# Patient Record
Sex: MALE | Race: WHITE
[De-identification: names, ages, dates, MRNs, and addresses within clinical notes are randomized per-mention and may not be internally consistent; named-entity substitution may affect disease eponyms.]

---

## 2019-09-03 ENCOUNTER — HOSPITAL ENCOUNTER (OUTPATIENT)
Dept: HOSPITAL 75 - PREOP | Age: 69
End: 2019-09-03
Attending: SPECIALIST
Payer: COMMERCIAL

## 2019-09-03 VITALS — HEIGHT: 74 IN | BODY MASS INDEX: 26.31 KG/M2 | WEIGHT: 205 LBS

## 2019-09-03 DIAGNOSIS — Z98.890: ICD-10-CM

## 2019-09-03 DIAGNOSIS — Z90.49: ICD-10-CM

## 2019-09-03 DIAGNOSIS — F10.99: ICD-10-CM

## 2019-09-03 DIAGNOSIS — Z87.891: ICD-10-CM

## 2019-09-03 DIAGNOSIS — Z01.818: Primary | ICD-10-CM

## 2019-09-03 DIAGNOSIS — H25.89: ICD-10-CM

## 2019-09-06 ENCOUNTER — HOSPITAL ENCOUNTER (OUTPATIENT)
Dept: HOSPITAL 75 - SDC | Age: 69
Discharge: HOME | End: 2019-09-06
Attending: SPECIALIST
Payer: COMMERCIAL

## 2019-09-06 VITALS — SYSTOLIC BLOOD PRESSURE: 159 MMHG | DIASTOLIC BLOOD PRESSURE: 87 MMHG

## 2019-09-06 VITALS — DIASTOLIC BLOOD PRESSURE: 86 MMHG | SYSTOLIC BLOOD PRESSURE: 145 MMHG

## 2019-09-06 VITALS — HEIGHT: 74 IN | WEIGHT: 205 LBS | BODY MASS INDEX: 26.31 KG/M2

## 2019-09-06 DIAGNOSIS — C43.9: ICD-10-CM

## 2019-09-06 DIAGNOSIS — Z79.899: ICD-10-CM

## 2019-09-06 DIAGNOSIS — H25.11: Primary | ICD-10-CM

## 2019-09-06 DIAGNOSIS — Z96.649: ICD-10-CM

## 2019-09-06 DIAGNOSIS — I10: ICD-10-CM

## 2019-09-06 DIAGNOSIS — E03.9: ICD-10-CM

## 2019-09-06 DIAGNOSIS — Z87.891: ICD-10-CM

## 2019-09-06 DIAGNOSIS — Z80.3: ICD-10-CM

## 2019-09-06 RX ADMIN — TETRACAINE HYDROCHLORIDE PRN ML: 5 SOLUTION OPHTHALMIC at 06:18

## 2019-09-06 RX ADMIN — TETRACAINE HYDROCHLORIDE PRN ML: 5 SOLUTION OPHTHALMIC at 06:36

## 2019-09-06 RX ADMIN — PHENYLEPHRINE HYDROCHLORIDE SCH ML: 100 SOLUTION/ DROPS OPHTHALMIC at 06:45

## 2019-09-06 RX ADMIN — CYCLOPENTOLATE HYDROCHLORIDE SCH ML: 10 SOLUTION/ DROPS OPHTHALMIC at 06:45

## 2019-09-06 RX ADMIN — CYCLOPENTOLATE HYDROCHLORIDE SCH ML: 10 SOLUTION/ DROPS OPHTHALMIC at 06:36

## 2019-09-06 RX ADMIN — PHENYLEPHRINE HYDROCHLORIDE SCH ML: 100 SOLUTION/ DROPS OPHTHALMIC at 06:36

## 2019-09-06 RX ADMIN — TETRACAINE HYDROCHLORIDE PRN ML: 5 SOLUTION OPHTHALMIC at 06:28

## 2019-09-06 RX ADMIN — CYCLOPENTOLATE HYDROCHLORIDE SCH ML: 10 SOLUTION/ DROPS OPHTHALMIC at 06:29

## 2019-09-06 RX ADMIN — TETRACAINE HYDROCHLORIDE PRN ML: 5 SOLUTION OPHTHALMIC at 06:44

## 2019-09-06 RX ADMIN — PHENYLEPHRINE HYDROCHLORIDE SCH ML: 100 SOLUTION/ DROPS OPHTHALMIC at 06:28

## 2019-09-06 NOTE — OPHTHALMOLOGY OPERATIVE REPORT
Cataract removal/placement IOL


PREOPERATIVE DIAGNOSIS: Cataract Right Eye


POSTOPERATIVE DIAGNOSIS: Cataract Right Eye





PROCEDURE: Cataract removal and placement of posterior chamber implant, right 

eye





SURGEON: Nilesh Jose 





ANESTHESIA: Topical with sedation





COMPLICATIONS: None





ESTIMATED BLOOD LOSS: Minimal 





DESCRIPTION OF PROCEDURE:


After proper informed consent was obtained, the patient, a 68 male, was taken to

the Operating Room and the right eye was anesthetized with tetracaine.  The 

right eye was then prepped and draped in the usual manner.  A wire lid speculum 

was placed. A paracentesis was made at the left hand position. Preservative free

lidocaine was injected into the anterior chamber followed by viscoelastic.  A 

clear corneal incision was made in the temporal position. A capsulorrhexis was 

preformed and the central nuclear and cortical material were removed.  The 

posterior capsule was polished and Pepe AU00T0 21.0  IOL was placed into the 

capsular bag. The residual viscoelastic was aspirated and balanced saline 

solution was injected into the anterior chamber. Moxifloxacin  was injected into

the anterior chamber.





The wound was checked and found to be water tight.





The patient tolerated the procedure well without complications.











NILESH JOSE MD              Sep 6, 2019 07:46

## 2019-09-06 NOTE — OPHTHALMOLOGIST PRE-OP NOTE
Pre-Operative Progress Note


H&P Reviewed


The H&P was reviewed, patient examined and no changes noted.


Date H&P Reviewed:  Sep 6, 2019


Time H&P Reviewed:  07:24


Pre-Op Dx


Cataract, Right Eye











SINDI JOSE MD              Sep 6, 2019 07:24

## 2019-09-06 NOTE — ANESTHESIA-GENERAL POST-OP
MAC


Patient Condition


Mental Status/LOC:  Same as Preop


Cardiovascular:  Satisfactory


Nausea/Vomiting:  Absent


Respiratory:  Satisfactory


Pain:  Controlled


Complications:  Absent





Post Op Complications


Complications


None





Follow Up Care/Instructions


Patient Instructions


None needed.





Anesthesiology Discharge Order


Discharge Order


Patient was seen after the procedure and he was doing well, no complaints, 

stable vital signs, no apparent adverse anesthesia problems.











JANNETH URBAN DO          Sep 6, 2019 12:49

## 2019-09-13 ENCOUNTER — HOSPITAL ENCOUNTER (OUTPATIENT)
Dept: HOSPITAL 75 - SDC | Age: 69
Discharge: HOME | End: 2019-09-13
Attending: SPECIALIST
Payer: COMMERCIAL

## 2019-09-13 VITALS
WEIGHT: 205.25 LBS | BODY MASS INDEX: 26.62 KG/M2 | HEIGHT: 73.74 IN | DIASTOLIC BLOOD PRESSURE: 92 MMHG | SYSTOLIC BLOOD PRESSURE: 156 MMHG

## 2019-09-13 VITALS — SYSTOLIC BLOOD PRESSURE: 154 MMHG | DIASTOLIC BLOOD PRESSURE: 82 MMHG

## 2019-09-13 DIAGNOSIS — E03.9: ICD-10-CM

## 2019-09-13 DIAGNOSIS — Z87.891: ICD-10-CM

## 2019-09-13 DIAGNOSIS — Z85.820: ICD-10-CM

## 2019-09-13 DIAGNOSIS — Z79.899: ICD-10-CM

## 2019-09-13 DIAGNOSIS — Z80.3: ICD-10-CM

## 2019-09-13 DIAGNOSIS — H25.12: Primary | ICD-10-CM

## 2019-09-13 RX ADMIN — PHENYLEPHRINE HYDROCHLORIDE SCH ML: 100 SOLUTION/ DROPS OPHTHALMIC at 06:33

## 2019-09-13 RX ADMIN — CYCLOPENTOLATE HYDROCHLORIDE SCH ML: 10 SOLUTION/ DROPS OPHTHALMIC at 06:33

## 2019-09-13 RX ADMIN — TETRACAINE HYDROCHLORIDE PRN ML: 5 SOLUTION OPHTHALMIC at 06:21

## 2019-09-13 RX ADMIN — CYCLOPENTOLATE HYDROCHLORIDE SCH ML: 10 SOLUTION/ DROPS OPHTHALMIC at 06:38

## 2019-09-13 RX ADMIN — PHENYLEPHRINE HYDROCHLORIDE SCH ML: 100 SOLUTION/ DROPS OPHTHALMIC at 06:38

## 2019-09-13 RX ADMIN — CYCLOPENTOLATE HYDROCHLORIDE SCH ML: 10 SOLUTION/ DROPS OPHTHALMIC at 06:28

## 2019-09-13 RX ADMIN — TETRACAINE HYDROCHLORIDE PRN ML: 5 SOLUTION OPHTHALMIC at 06:33

## 2019-09-13 RX ADMIN — TETRACAINE HYDROCHLORIDE PRN ML: 5 SOLUTION OPHTHALMIC at 06:28

## 2019-09-13 RX ADMIN — TETRACAINE HYDROCHLORIDE PRN ML: 5 SOLUTION OPHTHALMIC at 06:38

## 2019-09-13 RX ADMIN — PHENYLEPHRINE HYDROCHLORIDE SCH ML: 100 SOLUTION/ DROPS OPHTHALMIC at 06:28

## 2019-09-13 NOTE — OPHTHALMOLOGY OPERATIVE REPORT
Cataract removal/placement IOL


PREOPERATIVE DIAGNOSIS:    Cataract Left Eye


POSTOPERATIVE DIAGNOSIS: Cataract Left Eye





PROCEDURE: Cataract removal and placement of posterior chamber implant, left eye





SURGEON: Nilesh Jose 





ANESTHESIA: Topical with sedation





COMPLICATIONS: None





ESTIMATED BLOOD LOSS: Minimal 





DESCRIPTION OF PROCEDURE:


After proper informed consent was obtained, the patient, a 68 male, was taken to

the Operating Room and the left eye was anesthetized with tetracaine.  The left 

eye was then prepped and draped in the usual manner.  A wire lid speculum was 

placed. A paracentesis was made at the left hand position. Preservative free 

lidocaine was injected into the anterior chamber followed by viscoelastic.  A 

clear corneal incision was made in the temporal position. A capsulorrhexis was 

preformed and the central nuclear and cortical material were removed.  The 

posterior capsule was polished and an Pepe 22.0 AU00T0 was placed into the 

capsular bag. The residual viscoelastic was aspirated and balanced saline 

solution was injected into the anterior chamber.  Moxifloxacin was injected into

the anterior chamber.





The wound was checked and found to be water tight.





The patient tolerated the procedure well without complications.











NILESH JOSE MD             Sep 13, 2019 07:43

## 2019-09-13 NOTE — OPHTHALMOLOGIST PRE-OP NOTE
Pre-Operative Progress Note


H&P Reviewed


The H&P was reviewed, patient examined and no changes noted.


Date H&P Reviewed:  Sep 13, 2019


Time H&P Reviewed:  07:25


Pre-Op Dx


Cataract, Left Eye











SINDI JOSE MD             Sep 13, 2019 07:25

## 2023-02-28 ENCOUNTER — HOSPITAL ENCOUNTER (OUTPATIENT)
Dept: HOSPITAL 75 - CARD | Age: 73
End: 2023-02-28
Attending: INTERNAL MEDICINE
Payer: COMMERCIAL

## 2023-02-28 VITALS — DIASTOLIC BLOOD PRESSURE: 91 MMHG | SYSTOLIC BLOOD PRESSURE: 160 MMHG

## 2023-02-28 DIAGNOSIS — I25.10: Primary | ICD-10-CM

## 2023-02-28 DIAGNOSIS — I48.91: ICD-10-CM

## 2023-02-28 DIAGNOSIS — E78.5: ICD-10-CM

## 2023-02-28 DIAGNOSIS — I10: ICD-10-CM

## 2023-02-28 PROCEDURE — 93017 CV STRESS TEST TRACING ONLY: CPT

## 2023-02-28 PROCEDURE — 78452 HT MUSCLE IMAGE SPECT MULT: CPT

## 2023-02-28 NOTE — STRESS TEST
DATE OF SERVICE: 02/28/2023



RESTING AND POST REGADENOSON TECHNETIUM-99M TETROFOSMIN SPECT CT IMAGING



ORDERING PHYSICIAN:

Dr. Penn.



PRIMARY PHYSICIAN:

Dr. Clemens.



CLINICAL DIAGNOSES:

Atrial fibrillation, hypertension, hyperlipidemia.



Baseline images were carried out after injection of 10.76 mCi technetium-99m 

Tetrofosmin.  This was followed by 0.4 mg regadenoson and 32.1 mCi of 

technetium-99m Tetrofosmin for stress imaging.  Baseline electrocardiogram 

showed atrial fibrillation and the ventricular rate remained well controlled 

throughout the study.  The electrocardiogram did not change significantly with 

the regadenoson injection.  The patient noted some shortness of breath, which 

resolved in a few minutes.  Review of images at rest and following stress does 

not indicate any distinct perfusion defects consistent with significant 

myocardial ischemia or infarction.  Gated images show normal global left 

ventricular systolic function and normal regional wall motion.  Left ventricular

ejection fraction is calculated to be 63%.



CONCLUSIONS:

1.  This study does not indicate significant myocardial ischemia or infarction.

2.  Normal regional wall motion.

3.  Normal global left ventricular systolic function with a calculated ejection 

fraction of 63%.





Job ID: [id number]

DocumentID: [id number]

Dictated Date: 02/28/2023 17:23:42

Transcription Date: 02/28/2023 17:54:00

Dictated By: RICO PENN MD; MA; FACP; FACC;

## 2023-04-17 ENCOUNTER — HOSPITAL ENCOUNTER (OUTPATIENT)
Dept: HOSPITAL 75 - SLEEP | Age: 73
End: 2023-04-17
Attending: OTOLARYNGOLOGY
Payer: COMMERCIAL

## 2023-04-17 DIAGNOSIS — G47.33: Primary | ICD-10-CM

## 2023-04-17 DIAGNOSIS — I25.9: ICD-10-CM

## 2023-08-30 ENCOUNTER — HOSPITAL ENCOUNTER (OUTPATIENT)
Dept: HOSPITAL 75 - SLEEP | Age: 73
End: 2023-08-30
Attending: NURSE PRACTITIONER
Payer: COMMERCIAL

## 2023-08-30 DIAGNOSIS — G47.33: Primary | ICD-10-CM

## 2023-08-30 PROCEDURE — 95811 POLYSOM 6/>YRS CPAP 4/> PARM: CPT
